# Patient Record
Sex: MALE | Race: ASIAN | NOT HISPANIC OR LATINO | Employment: STUDENT | URBAN - NONMETROPOLITAN AREA
[De-identification: names, ages, dates, MRNs, and addresses within clinical notes are randomized per-mention and may not be internally consistent; named-entity substitution may affect disease eponyms.]

---
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
## 2023-10-30 ENCOUNTER — OFFICE VISIT (OUTPATIENT)
Dept: FAMILY MEDICINE CLINIC | Facility: CLINIC | Age: 18
End: 2023-10-30

## 2023-10-30 VITALS
TEMPERATURE: 98.1 F | BODY MASS INDEX: 25.62 KG/M2 | HEIGHT: 70 IN | RESPIRATION RATE: 16 BRPM | WEIGHT: 179 LBS | SYSTOLIC BLOOD PRESSURE: 126 MMHG | HEART RATE: 87 BPM | DIASTOLIC BLOOD PRESSURE: 78 MMHG | OXYGEN SATURATION: 99 %

## 2023-10-30 DIAGNOSIS — J06.9 VIRAL URI: Primary | ICD-10-CM

## 2023-10-30 PROCEDURE — 99213 OFFICE O/P EST LOW 20 MIN: CPT | Performed by: NURSE PRACTITIONER

## 2023-10-30 NOTE — LETTER
October 30, 2023     Patient: Rishi Murillo   YOB: 2005   Date of Visit: 10/30/2023       To Whom it May Concern:    Rishi Murillo was seen in my clinic on 10/30/2023. He  may return to school in two days or when is fever free without any antipyretic medications.            Sincerely,          JAYANT Tabares        CC: No Recipients

## 2023-10-30 NOTE — PROGRESS NOTES
"    Office Note     Name: Rishi Murillo    : 2005     MRN: [id number]     Chief Complaint  No chief complaint on file.    Subjective     History of Present Illness:  Rishi Murillo is a 17 y.o. male who presents today for c/o cough, fever of 103 on Saturday; 101 this AM. Has been taking OTC cold/cough medication which helps control fever/symptoms. Denies any sick contacts. Denies n/v/abd pain/diarrhea/ST/ear pain. Does admit to sinus congestion/drainage and some headache this AM.  Has not had flu shot this year yet.     Review of Systems:   Review of Systems as per HPI.     Family History: History reviewed. No pertinent family history.    Social History:   Social History     Socioeconomic History    Marital status: Single   Tobacco Use    Smoking status: Never    Smokeless tobacco: Never   Vaping Use    Vaping Use: Never used   Substance and Sexual Activity    Alcohol use: Never    Drug use: Never       Past Medical History: History reviewed. No pertinent past medical history.    Past Surgical History: History reviewed. No pertinent surgical history.    Immunizations:   There is no immunization history on file for this patient.     Medications:   No current outpatient medications on file.    Allergies:   No Known Allergies    Objective     Vital Signs  /78   Pulse 87   Temp 98.1 °F (36.7 °C)   Resp 16   Ht 177.8 cm (70\")   Wt 81.2 kg (179 lb)   SpO2 99%   BMI 25.68 kg/m²   Estimated body mass index is 25.68 kg/m² as calculated from the following:    Height as of this encounter: 177.8 cm (70\").    Weight as of this encounter: 81.2 kg (179 lb).    Pediatric BMI = 77 %ile (Z= 0.75) based on CDC (Boys, 2-20 Years) BMI-for-age based on BMI available as of 10/30/2023.. BMI is within normal parameters. No other follow-up for BMI required.      Physical Exam  Vitals and nursing note reviewed.   Constitutional:       Appearance: Normal appearance.   HENT:      Head: Normocephalic and atraumatic.      " Right Ear: Tympanic membrane, ear canal and external ear normal.      Left Ear: Tympanic membrane, ear canal and external ear normal.      Nose: Congestion and rhinorrhea present. Rhinorrhea is clear.      Right Turbinates: Swollen.      Left Turbinates: Swollen.      Right Sinus: No maxillary sinus tenderness or frontal sinus tenderness.      Left Sinus: No maxillary sinus tenderness or frontal sinus tenderness.      Mouth/Throat:      Mouth: Mucous membranes are moist.      Pharynx: Oropharynx is clear. No oropharyngeal exudate or posterior oropharyngeal erythema.   Eyes:      General:         Right eye: No discharge.         Left eye: No discharge.      Extraocular Movements: Extraocular movements intact.      Pupils: Pupils are equal, round, and reactive to light.   Cardiovascular:      Rate and Rhythm: Normal rate and regular rhythm.      Heart sounds: No murmur heard.  Pulmonary:      Effort: Pulmonary effort is normal.      Breath sounds: Normal breath sounds.      Comments: No cough with deep breathing for exam.   Abdominal:      General: Abdomen is flat.      Palpations: Abdomen is soft.   Musculoskeletal:         General: Normal range of motion.      Cervical back: Normal range of motion and neck supple.   Skin:     General: Skin is warm and dry.   Neurological:      Mental Status: He is alert and oriented to person, place, and time.   Psychiatric:         Mood and Affect: Mood normal.         Behavior: Behavior normal.          Procedures    Assessment and Plan     1. Viral URI     In house POCT flu/COVID test negative.  Reviewed results with patient.  Commended continued use of over-the-counter cold/flu medication as well as to increase fluid intake and rest.  Perform good hand hygiene.  Return to clinic if symptoms worsen or do not begin to improve within the next 48 hours.  Patient stated understanding and agreed with plan of care.    Follow Up  Return if symptoms worsen or fail to improve.    Sybil  JAYANT Calero [facility]  [address]  [facility] [address] [phone]

## 2024-03-11 ENCOUNTER — OFFICE VISIT (OUTPATIENT)
Dept: FAMILY MEDICINE CLINIC | Facility: CLINIC | Age: 19
End: 2024-03-11

## 2024-03-11 VITALS
HEART RATE: 102 BPM | WEIGHT: 178 LBS | SYSTOLIC BLOOD PRESSURE: 130 MMHG | BODY MASS INDEX: 24.11 KG/M2 | HEIGHT: 72 IN | OXYGEN SATURATION: 98 % | TEMPERATURE: 98.3 F | DIASTOLIC BLOOD PRESSURE: 80 MMHG

## 2024-03-11 DIAGNOSIS — U07.1 COVID-19: Primary | ICD-10-CM

## 2024-03-11 RX ORDER — BROMPHENIRAMINE MALEATE, PSEUDOEPHEDRINE HYDROCHLORIDE, AND DEXTROMETHORPHAN HYDROBROMIDE 2; 30; 10 MG/5ML; MG/5ML; MG/5ML
5 SYRUP ORAL 4 TIMES DAILY PRN
Qty: 200 ML | Refills: 0 | Status: SHIPPED | OUTPATIENT
Start: 2024-03-11 | End: 2024-03-21

## 2024-03-11 NOTE — LETTER
March 11, 2024     Patient: Rishi Murillo   YOB: 2005   Date of Visit: 3/11/2024       To Whom it May Concern:    Rishi Murillo was seen in my clinic on 3/11/2024. He may return to school in three days.         Sincerely,          JAYANT Tabares        CC: No Recipients

## 2024-03-11 NOTE — LETTER
March 11, 2024     Patient: Rishi Murillo   YOB: 2005   Date of Visit: 3/11/2024       To Whom It May Concern:    It is my medical opinion that Rishi Murillo may return to school when he has been symptom free for 24 hours or in 5 day's time with a mask.            Sincerely,        JAYANT Tabares    CC: No Recipients

## 2024-03-11 NOTE — PROGRESS NOTES
"    Office Note     Name: Rishi Murillo    : 2005     MRN: [id number]     Chief Complaint  Cough and Fever (Started last night )    Subjective     History of Present Illness:  Rishi Murillo is a 18 y.o. male who presents today for cough and fever that started yesterday morning. Denies any sick contacts. States had a 100 degree fever last night. Hasn't taken any medications for s/s. Denies any n/v/abd pain/diarrhea, no ear pain/pressure or ST.     Review of Systems:   Review of Systems as per HPI.     Family History: No family history on file.    Social History:   Social History     Socioeconomic History    Marital status: Single   Tobacco Use    Smoking status: Never    Smokeless tobacco: Never   Vaping Use    Vaping status: Never Used   Substance and Sexual Activity    Alcohol use: Never    Drug use: Never       Past Medical History: History reviewed. No pertinent past medical history.    Past Surgical History: No past surgical history on file.    Immunizations:   There is no immunization history on file for this patient.     Medications:     Current Outpatient Medications:     brompheniramine-pseudoephedrine-DM 30-2-10 MG/5ML syrup, Take 5 mL by mouth 4 (Four) Times a Day As Needed for Congestion or Cough for up to 10 days., Disp: 200 mL, Rfl: 0    Allergies:   No Known Allergies    Objective     Vital Signs  /80   Pulse 102   Temp 98.3 °F (36.8 °C)   Ht 182.9 cm (72\")   Wt 80.7 kg (178 lb)   SpO2 98%   BMI 24.14 kg/m²   Estimated body mass index is 24.14 kg/m² as calculated from the following:    Height as of this encounter: 182.9 cm (72\").    Weight as of this encounter: 80.7 kg (178 lb).    BMI is within normal parameters. No other follow-up for BMI required.      Physical Exam  Vitals and nursing note reviewed.   Constitutional:       Appearance: Normal appearance.   HENT:      Head: Normocephalic and atraumatic.      Right Ear: Tympanic membrane, ear canal and external ear normal.      " Left Ear: Tympanic membrane, ear canal and external ear normal.      Nose: Congestion and rhinorrhea present.      Right Turbinates: Swollen.      Left Turbinates: Swollen.      Right Sinus: No maxillary sinus tenderness or frontal sinus tenderness.      Left Sinus: No maxillary sinus tenderness or frontal sinus tenderness.      Mouth/Throat:      Mouth: Mucous membranes are moist.      Pharynx: Oropharynx is clear. No oropharyngeal exudate or posterior oropharyngeal erythema.      Tonsils: No tonsillar exudate.   Eyes:      General:         Right eye: No discharge.         Left eye: No discharge.      Extraocular Movements: Extraocular movements intact.      Pupils: Pupils are equal, round, and reactive to light.   Cardiovascular:      Rate and Rhythm: Normal rate and regular rhythm.      Heart sounds: No murmur heard.  Pulmonary:      Effort: Pulmonary effort is normal.      Breath sounds: Normal breath sounds.      Comments: Occasional dry cough  Abdominal:      General: Abdomen is flat.      Palpations: Abdomen is soft.   Musculoskeletal:         General: Normal range of motion.      Cervical back: Normal range of motion and neck supple.   Lymphadenopathy:      Cervical: No cervical adenopathy.   Skin:     General: Skin is warm and dry.   Neurological:      Mental Status: He is alert and oriented to person, place, and time.   Psychiatric:         Mood and Affect: Mood normal.         Behavior: Behavior normal.          Procedures    Assessment and Plan     1. COVID-19    - brompheniramine-pseudoephedrine-DM 30-2-10 MG/5ML syrup; Take 5 mL by mouth 4 (Four) Times a Day As Needed for Congestion or Cough for up to 10 days.  Dispense: 200 mL; Refill: 0     In house COVID test positive; negative for flu a/b - reviewed results with patient. Reviewed use of bromphed DM including side effects. Advised to continue to increase fluid intake greatly, as well as rest and perform  good hand hygiene. Tylenol or ibuprofen for  fever/pain. Advised isolating until symptom free, or for 5 days and then can mask (note given for school). RTC if s/s worsen or do not improve with time and medications. Patient stated understanding and agreed with POC.     Follow Up  Return if symptoms worsen or fail to improve.    JAYANT Nguyen [facility]  [address]  [facility] [address] [phone]